# Patient Record
Sex: MALE | Race: OTHER | Employment: FULL TIME | URBAN - NONMETROPOLITAN AREA
[De-identification: names, ages, dates, MRNs, and addresses within clinical notes are randomized per-mention and may not be internally consistent; named-entity substitution may affect disease eponyms.]

---

## 2017-12-28 ENCOUNTER — OFFICE VISIT (OUTPATIENT)
Dept: OPTOMETRY | Age: 62
End: 2017-12-28
Payer: COMMERCIAL

## 2017-12-28 DIAGNOSIS — H52.03 HYPEROPIA OF BOTH EYES WITH ASTIGMATISM AND PRESBYOPIA: Primary | ICD-10-CM

## 2017-12-28 DIAGNOSIS — H54.40 BLIND LEFT EYE: ICD-10-CM

## 2017-12-28 DIAGNOSIS — H52.4 HYPEROPIA OF BOTH EYES WITH ASTIGMATISM AND PRESBYOPIA: Primary | ICD-10-CM

## 2017-12-28 DIAGNOSIS — H52.203 HYPEROPIA OF BOTH EYES WITH ASTIGMATISM AND PRESBYOPIA: Primary | ICD-10-CM

## 2017-12-28 PROCEDURE — 92014 COMPRE OPH EXAM EST PT 1/>: CPT | Performed by: OPTOMETRIST

## 2017-12-28 RX ORDER — BENOXINATE HCL/FLUORESCEIN SOD 0.4%-0.25%
1 DROPS OPHTHALMIC (EYE) ONCE
Status: COMPLETED | OUTPATIENT
Start: 2017-12-28 | End: 2017-12-28

## 2017-12-28 RX ADMIN — Medication 1 DROP: at 13:29

## 2017-12-28 ASSESSMENT — REFRACTION_MANIFEST
OD_AXIS: 082
OD_SPHERE: +2.00
OD_CYLINDER: -0.75
OD_ADD: +2.25

## 2017-12-28 ASSESSMENT — SLIT LAMP EXAM - LIDS
COMMENTS: NORMAL
COMMENTS: NORMAL

## 2017-12-28 ASSESSMENT — REFRACTION_WEARINGRX
OD_SPHERE: +2.00
OD_CYLINDER: -0.75
OD_AXIS: 085
OD_ADD: +2.25
OS_SPHERE: BALANCE
OS_ADD: +2.25
SPECS_TYPE: BIFOCAL

## 2017-12-28 ASSESSMENT — KERATOMETRY
OD_AXISANGLE_DEGREES: 074
OD_AXISANGLE2_DEGREES: 164
OD_K1POWER_DIOPTERS: 36.75
OD_K2POWER_DIOPTERS: 42.75

## 2017-12-28 ASSESSMENT — VISUAL ACUITY
METHOD: SNELLEN - LINEAR
CORRECTION_TYPE: GLASSES

## 2017-12-28 NOTE — PROGRESS NOTES
Conan Nageotte presents today for   Chief Complaint   Patient presents with    Vision Exam   .    HPI     Last Vision Exam: 11/17/2016 AW  Last Ophthalmology Exam: Henrine Louder eye Retinal Detachment 2014  Last Filled Glasses Rx: 11/17/2016  [facility]: [facility]  Update: Glasses  He lost his glasses currently wearing glasses form 4 yrs ago. Main Ophthalmology Exam     External Exam       Right Left    External Normal Normal          Slit Lamp Exam       Right Left    Lids/Lashes Normal Normal    Conjunctiva/Sclera White and quiet White and quiet    Cornea Clear Clear    Anterior Chamber Deep and quiet Deep and quiet    Iris Round and reactive Round and reactive    Lens 1+ Nuclear sclerosis 4+ Nuclear sclerosis    Vitreous Normal Normal          Fundus Exam       Right Left    Disc type3 poor view ; hx of retinal detachment     C/D Ratio 0.2-.25                     Not recorded         Visual Acuity (Snellen - Linear)       Right Left    Dist cc 20/60     Correction:  Glasses        Keratometry     Keratometry       K1 Axis K2 Axis    Right 36.75 164 42.75 074    Left                    Ophthalmology Exam     Wearing Rx       Sphere Cylinder Axis Add    Right +2.00 -0.75 085 +2.25    Left Balance   +2.25    Age:  3yr    Type:  Bifocal                Manifest Refraction     Manifest Refraction       Sphere Cylinder [place] Dist [facility] Add Near [facility]    Right +2.00 -0.75 082 20/25 +2.25 20/25    Left                Manifest Refraction #2 (Auto)       Sphere Cylinder [place] Dist [facility] Add Near [facility]    Right +2.25 -0.50 094       Left                     Final Rx       Sphere Cylinder Axis Dist [facility] Add    Right +2.00 -0.75 082 20/25 +2.25    Left balance     +2.25    Type:  Bifocal    Expiration Date:  12/29/2019            1. Hyperopia of both eyes with astigmatism and presbyopia    2.  Blind left eye           Patient Instructions   New glasses recommended      Return in about 2 years (around 12/28/2019) for complete eye exam.

## 2018-04-20 ENCOUNTER — HOSPITAL ENCOUNTER (OUTPATIENT)
Age: 63
Discharge: HOME | End: 2018-04-20
Payer: COMMERCIAL

## 2018-04-20 ENCOUNTER — HOSPITAL ENCOUNTER (OUTPATIENT)
Dept: GENERAL RADIOLOGY | Age: 63
Discharge: HOME | End: 2018-04-20
Payer: COMMERCIAL

## 2018-04-20 DIAGNOSIS — R06.02 SOB (SHORTNESS OF BREATH): ICD-10-CM

## 2018-04-20 DIAGNOSIS — J40 BRONCHITIS: ICD-10-CM

## 2018-04-20 PROCEDURE — 71046 X-RAY EXAM CHEST 2 VIEWS: CPT

## 2019-01-10 ENCOUNTER — OFFICE VISIT (OUTPATIENT)
Dept: OPTOMETRY | Age: 64
End: 2019-01-10
Payer: COMMERCIAL

## 2019-01-10 DIAGNOSIS — H52.03 HYPEROPIA OF BOTH EYES WITH ASTIGMATISM AND PRESBYOPIA: Primary | ICD-10-CM

## 2019-01-10 DIAGNOSIS — H33.8 OLD RETINAL DETACHMENT, PARTIAL: ICD-10-CM

## 2019-01-10 DIAGNOSIS — H52.203 HYPEROPIA OF BOTH EYES WITH ASTIGMATISM AND PRESBYOPIA: Primary | ICD-10-CM

## 2019-01-10 DIAGNOSIS — H52.4 HYPEROPIA OF BOTH EYES WITH ASTIGMATISM AND PRESBYOPIA: Primary | ICD-10-CM

## 2019-01-10 PROCEDURE — 92014 COMPRE OPH EXAM EST PT 1/>: CPT | Performed by: OPTOMETRIST

## 2019-01-10 ASSESSMENT — VISUAL ACUITY
OS_CC: LP
METHOD: SNELLEN - LINEAR
OD_CC+: -2
CORRECTION_TYPE: GLASSES

## 2019-01-10 ASSESSMENT — REFRACTION_WEARINGRX
OD_AXIS: 082
OS_SPHERE: BALANCE
OD_CYLINDER: -0.75
OS_ADD: +2.25
SPECS_TYPE: BIFOCAL
OD_SPHERE: +2.00
OD_ADD: +2.25

## 2019-01-10 ASSESSMENT — KERATOMETRY
OD_AXISANGLE2_DEGREES: 044
OD_K2POWER_DIOPTERS: 45.00
OD_AXISANGLE_DEGREES: 134
OD_K1POWER_DIOPTERS: 44.50

## 2019-01-10 ASSESSMENT — TONOMETRY
OD_IOP_MMHG: 21
OS_IOP_MMHG: 12
IOP_METHOD: NON-CONTACT AIR PUFF

## 2019-01-10 ASSESSMENT — SLIT LAMP EXAM - LIDS
COMMENTS: NORMAL
COMMENTS: NORMAL

## 2020-08-11 ENCOUNTER — OFFICE VISIT (OUTPATIENT)
Dept: OPTOMETRY | Age: 65
End: 2020-08-11
Payer: COMMERCIAL

## 2020-08-11 PROCEDURE — 92014 COMPRE OPH EXAM EST PT 1/>: CPT | Performed by: OPTOMETRIST

## 2020-08-11 PROCEDURE — 99211 OFF/OP EST MAY X REQ PHY/QHP: CPT

## 2020-08-11 ASSESSMENT — REFRACTION_MANIFEST
OS_ADD: +2.25
OS_SPHERE: BALANCE
OD_SPHERE: +2.50
OD_ADD: +2.25
OD_AXIS: 085
OD_CYLINDER: -0.50

## 2020-08-11 ASSESSMENT — VISUAL ACUITY
OD_CC: 20/25
CORRECTION_TYPE: GLASSES
OS_CC: LP
METHOD: SNELLEN - LINEAR

## 2020-08-11 ASSESSMENT — KERATOMETRY
OS_AXISANGLE_DEGREES: 061
OS_K2POWER_DIOPTERS: 45.50
METHOD_AUTO_MANUAL: AUTOMATED
OS_K1POWER_DIOPTERS: 45.00
OS_AXISANGLE2_DEGREES: 151

## 2020-08-11 ASSESSMENT — TONOMETRY
OS_IOP_MMHG: 11
IOP_METHOD: NON-CONTACT AIR PUFF
OD_IOP_MMHG: 26

## 2020-08-11 ASSESSMENT — REFRACTION_WEARINGRX
OD_SPHERE: +2.25
SPECS_TYPE: BIFOCAL
OD_CYLINDER: -0.50
OD_ADD: +2.25
OD_AXIS: 084
OS_SPHERE: BALANCE

## 2020-08-11 ASSESSMENT — SLIT LAMP EXAM - LIDS
COMMENTS: NORMAL
COMMENTS: NORMAL

## 2020-08-11 NOTE — PROGRESS NOTES
Flaquito Saul presents today for   Chief Complaint   Patient presents with    Blurred Vision    Vision Exam   .    HPI     Blurred Vision     In both eyes. Vision is blurred. Context:  distance vision and reading. Comments     Last Vision Exam: 1/10/2019 AW  Last Ophthalmology Exam: n/a  Last Filled Glasses Rx: 1/10/2019  Insurance: [facility]  Update: Glasses  Distance and reading are getting more blurry  Here with an  because does not speak English  Full time glasses                Current Outpatient Medications   Medication Sig Dispense Refill    PRENATAL VITAMINS PO Take  by mouth. No current facility-administered medications for this visit.           Family History   Problem Relation Age of Onset    Glaucoma Neg Hx     Diabetes Neg Hx     Cataracts Neg Hx      Social History     Socioeconomic History    Marital status: Single     Spouse name: None    Number of children: None    Years of education: None    Highest education level: None   Occupational History    None   Social Needs    Financial resource strain: None    Food insecurity     Worry: None     Inability: None    Transportation needs     Medical: None     Non-medical: None   Tobacco Use    Smoking status: Current Every Day Smoker     Types: Cigarettes    Smokeless tobacco: Never Used   Substance and Sexual Activity    Alcohol use: None    Drug use: None    Sexual activity: None   Lifestyle    Physical activity     Days per week: None     Minutes per session: None    Stress: None   Relationships    Social connections     Talks on phone: None     Gets together: None     Attends Sikhism service: None     Active member of club or organization: None     Attends meetings of clubs or organizations: None     Relationship status: None    Intimate partner violence     Fear of current or ex partner: None     Emotionally abused: None     Physically abused: None     Forced sexual activity: None   Other Topics Concern    None   Social History Narrative    None     History reviewed. No pertinent past medical history. Main Ophthalmology Exam     External Exam       Right Left    External Normal Normal          Slit Lamp Exam       Right Left    Lids/Lashes Normal Normal    Conjunctiva/Sclera White and quiet White and quiet    Cornea Clear Clear    Anterior Chamber Deep and quiet Deep and quiet    Iris Round and reactive Round and reactive    Lens Trace Nuclear sclerosis, 1+ Cortical cataract 3+ Nuclear sclerosis    Vitreous Normal Normal          Fundus Exam       Right Left    Disc type3 total retinal detachment long stainding     C/D Ratio 0.2-. 25     Macula Normal     Vessels Normal     Periphery Normal;wnl                     Tonometry     Tonometry (Non-contact air puff, 1:26 PM)       Right Left    Pressure 26 11   IOP.8             8.2  CH:  7.8          9.4  WS: 8.8          4.5                  Not recorded         Not recorded          Visual Acuity (Snellen - Linear)       Right Left    Dist cc 20/30 LP    Near cc 20/25     Correction:  Glasses          Pupils     Pupils       Pupils    Right PERRL    Left PERRL              Neuro/Psych     Neuro/Psych     Oriented x3:  Yes    Mood/Affect:  Normal              Keratometry     Keratometry (Automated)       K1 Axis K2 Axis    Right        Left 45.00 151 45.50 061                  Ophthalmology Exam     Wearing Rx       Sphere Cylinder Axis Add    Right +2.25 -0.50 084 +2.25    Left BALANCE        Age:  3yr    Type:  Bifocal              Manifest Refraction     Manifest Refraction       Sphere Cylinder Axis Dist [facility] Add    Right +2.50 -0.50 085 20/25+ +2.25    Left BALANCE     +2.25          Manifest Refraction #2 (Auto)       Sphere Cylinder Axis Dist [place] Add    Right +2.50 -0.50 101      Left -18.25 -0.25 160                 Final Rx       Sphere Cylinder Axis Dist [place] Add    Right +2.50 -0.50 085 20/25+ +2.25    Left BALANCE     +2.25    Type:

## 2020-08-11 NOTE — PATIENT INSTRUCTIONS
New glasses recommended;  Recommend exam every year because of retinal detachment in the left eye    If any flashes or floaters in the right eye, return and call to come in and be checked right away

## 2020-08-19 ENCOUNTER — HOSPITAL ENCOUNTER (OUTPATIENT)
Age: 65
Setting detail: SPECIMEN
Discharge: HOME | End: 2020-08-19
Payer: COMMERCIAL

## 2020-08-19 LAB
ABSOLUTE EOS #: 0.17 K/UL (ref 0–0.44)
ABSOLUTE IMMATURE GRANULOCYTE: 0.03 K/UL (ref 0–0.3)
ABSOLUTE LYMPH #: 1.61 K/UL (ref 1.1–3.7)
ABSOLUTE MONO #: 0.57 K/UL (ref 0.1–1.2)
ALBUMIN SERPL-MCNC: 3.9 G/DL (ref 3.5–5.2)
ALBUMIN/GLOBULIN RATIO: 1.5 (ref 1–2.5)
ALP BLD-CCNC: 68 U/L (ref 40–129)
ALT SERPL-CCNC: 11 U/L (ref 5–41)
ANION GAP SERPL CALCULATED.3IONS-SCNC: 10 MMOL/L (ref 9–17)
AST SERPL-CCNC: 19 U/L
BASOPHILS # BLD: 1 % (ref 0–2)
BASOPHILS ABSOLUTE: 0.05 K/UL (ref 0–0.2)
BILIRUB SERPL-MCNC: 0.22 MG/DL (ref 0.3–1.2)
BUN BLDV-MCNC: 15 MG/DL (ref 8–23)
BUN/CREAT BLD: ABNORMAL (ref 9–20)
CALCIUM SERPL-MCNC: 9 MG/DL (ref 8.6–10.4)
CHLORIDE BLD-SCNC: 104 MMOL/L (ref 98–107)
CHOLESTEROL/HDL RATIO: 3.5
CHOLESTEROL: 166 MG/DL
CO2: 26 MMOL/L (ref 20–31)
CREAT SERPL-MCNC: 0.84 MG/DL (ref 0.7–1.2)
DIFFERENTIAL TYPE: ABNORMAL
EOSINOPHILS RELATIVE PERCENT: 2 % (ref 1–4)
GFR AFRICAN AMERICAN: >60 ML/MIN
GFR NON-AFRICAN AMERICAN: >60 ML/MIN
GFR SERPL CREATININE-BSD FRML MDRD: ABNORMAL ML/MIN/{1.73_M2}
GFR SERPL CREATININE-BSD FRML MDRD: ABNORMAL ML/MIN/{1.73_M2}
GLUCOSE BLD-MCNC: 95 MG/DL (ref 70–99)
HAV IGM SER IA-ACNC: NONREACTIVE
HCT VFR BLD CALC: 44.3 % (ref 40.7–50.3)
HDLC SERPL-MCNC: 47 MG/DL
HEMOGLOBIN: 14.3 G/DL (ref 13–17)
HEPATITIS B CORE IGM ANTIBODY: NONREACTIVE
HEPATITIS B SURFACE ANTIGEN: NONREACTIVE
HEPATITIS C ANTIBODY: NONREACTIVE
HIV AG/AB: NONREACTIVE
IMMATURE GRANULOCYTES: 0 %
LDL CHOLESTEROL: 86 MG/DL (ref 0–130)
LYMPHOCYTES # BLD: 18 % (ref 24–43)
MCH RBC QN AUTO: 30.3 PG (ref 25.2–33.5)
MCHC RBC AUTO-ENTMCNC: 32.3 G/DL (ref 28.4–34.8)
MCV RBC AUTO: 93.9 FL (ref 82.6–102.9)
MONOCYTES # BLD: 7 % (ref 3–12)
NRBC AUTOMATED: 0 PER 100 WBC
PDW BLD-RTO: 12.7 % (ref 11.8–14.4)
PLATELET # BLD: 244 K/UL (ref 138–453)
PLATELET ESTIMATE: ABNORMAL
PMV BLD AUTO: 10.5 FL (ref 8.1–13.5)
POTASSIUM SERPL-SCNC: 4.3 MMOL/L (ref 3.7–5.3)
RBC # BLD: 4.72 M/UL (ref 4.21–5.77)
RBC # BLD: ABNORMAL 10*6/UL
SEG NEUTROPHILS: 72 % (ref 36–65)
SEGMENTED NEUTROPHILS ABSOLUTE COUNT: 6.35 K/UL (ref 1.5–8.1)
SODIUM BLD-SCNC: 140 MMOL/L (ref 135–144)
T. PALLIDUM, IGG: NONREACTIVE
THYROXINE, FREE: 1.13 NG/DL (ref 0.93–1.7)
TOTAL PROTEIN: 6.5 G/DL (ref 6.4–8.3)
TRIGL SERPL-MCNC: 164 MG/DL
TSH SERPL DL<=0.05 MIU/L-ACNC: 0.62 MIU/L (ref 0.3–5)
VLDLC SERPL CALC-MCNC: ABNORMAL MG/DL (ref 1–30)
WBC # BLD: 8.8 K/UL (ref 3.5–11.3)
WBC # BLD: ABNORMAL 10*3/UL

## 2020-08-22 LAB
C. TRACHOMATIS DNA ,URINE: NEGATIVE
N. GONORRHOEAE DNA, URINE: NEGATIVE
SPECIMEN DESCRIPTION: NORMAL

## 2022-03-24 ENCOUNTER — OFFICE VISIT (OUTPATIENT)
Dept: OPTOMETRY | Age: 67
End: 2022-03-24
Payer: COMMERCIAL

## 2022-03-24 DIAGNOSIS — H33.002 RETINAL DETACHMENT WITH RETINAL DEFECT OF LEFT EYE: ICD-10-CM

## 2022-03-24 DIAGNOSIS — H52.02 HYPEROPIA OF LEFT EYE WITH ASTIGMATISM AND PRESBYOPIA: Primary | ICD-10-CM

## 2022-03-24 DIAGNOSIS — H54.40 BLINDNESS OF LEFT EYE WITH NORMAL VISION IN CONTRALATERAL EYE: ICD-10-CM

## 2022-03-24 DIAGNOSIS — H52.4 HYPEROPIA OF LEFT EYE WITH ASTIGMATISM AND PRESBYOPIA: Primary | ICD-10-CM

## 2022-03-24 DIAGNOSIS — H52.202 HYPEROPIA OF LEFT EYE WITH ASTIGMATISM AND PRESBYOPIA: Primary | ICD-10-CM

## 2022-03-24 PROCEDURE — 99211 OFF/OP EST MAY X REQ PHY/QHP: CPT

## 2022-03-24 PROCEDURE — 92014 COMPRE OPH EXAM EST PT 1/>: CPT | Performed by: OPTOMETRIST

## 2022-03-24 ASSESSMENT — REFRACTION_WEARINGRX
SPECS_TYPE: BIFOCAL
OD_ADD: +2.25
OD_AXIS: 085
OD_CYLINDER: -0.50
OD_SPHERE: +2.50
OS_ADD: +2.25
OS_SPHERE: BALANCE

## 2022-03-24 ASSESSMENT — VISUAL ACUITY
METHOD: SNELLEN - LINEAR
OS_SC: LP
OD_CC: 20/20 OU
CORRECTION_TYPE: GLASSES
OD_CC+: +2

## 2022-03-24 ASSESSMENT — TONOMETRY
OD_IOP_MMHG: 37
IOP_METHOD: NON-CONTACT AIR PUFF
OS_IOP_MMHG: 12

## 2022-03-24 ASSESSMENT — ENCOUNTER SYMPTOMS
RESPIRATORY NEGATIVE: 0
GASTROINTESTINAL NEGATIVE: 0
ALLERGIC/IMMUNOLOGIC NEGATIVE: 0
EYES NEGATIVE: 0

## 2022-03-24 ASSESSMENT — SLIT LAMP EXAM - LIDS
COMMENTS: NORMAL
COMMENTS: NORMAL

## 2022-03-24 ASSESSMENT — KERATOMETRY
OD_AXISANGLE2_DEGREES: 038
OD_AXISANGLE_DEGREES: 128
OS_K1POWER_DIOPTERS: 45.25
OS_K2POWER_DIOPTERS: 45.25
OS_AXISANGLE2_DEGREES: 000
OD_K1POWER_DIOPTERS: 44.75
OS_AXISANGLE_DEGREES: 090
METHOD_AUTO_MANUAL: AUTOMATED
OD_K2POWER_DIOPTERS: 45.00

## 2022-03-24 ASSESSMENT — REFRACTION_MANIFEST
OD_ADD: +2.50
OD_CYLINDER: -0.75
OD_SPHERE: +2.25
OS_SPHERE: BALANCE
OD_AXIS: 087

## 2022-03-24 NOTE — PROGRESS NOTES
Cassius Sommers presents today for   Chief Complaint   Patient presents with    Blurred Vision    Vision Exam   .    HPI     Blurred Vision     Laterality: right eye    Quality: blurred    Context: distance vision, near vision and reading              Comments     Last Vision Exam: 8/11/2020 Aw  Last Ophthalmology Exam: n/a  Last Filled Glasses Rx: 1/11/2021  Insurance: [employer]  Update: Glasses  Distance and reading are getting more blurry in the right eye  Does not speak very good english  Full time glasses  Doing pretty well                Current Outpatient Medications   Medication Sig Dispense Refill    PRENATAL VITAMINS PO Take  by mouth. No current facility-administered medications for this visit. Family History   Problem Relation Age of Onset    Glaucoma Neg Hx     Diabetes Neg Hx     Cataracts Neg Hx      Social History     Socioeconomic History    Marital status: Single     Spouse name: None    Number of children: None    Years of education: None    Highest education level: None   Occupational History    None   Tobacco Use    Smoking status: Current Every Day Smoker     Types: Cigarettes    Smokeless tobacco: Never Used   Substance and Sexual Activity    Alcohol use: None    Drug use: None    Sexual activity: None   Other Topics Concern    None   Social History Narrative    None     Social Determinants of Health     Financial Resource Strain:     Difficulty of Paying Living Expenses: Not on file   Food Insecurity:     Worried About Running Out of Food in the Last Year: Not on file    [place] of Food in the Last Year: Not on file   Transportation Needs:     Lack of Transportation (Medical): Not on file    Lack of Transportation (Non-Medical):  Not on file   Physical Activity:     Days of Exercise per Week: Not on file    Minutes of Exercise per Session: Not on file   Stress:     Feeling of Stress : Not on file   Social Connections:     Frequency of Communication with Friends and Family: Not on file    Frequency of Social Gatherings with Friends and Family: Not on file    Attends Taoist Services: Not on file    Active Member of Clubs or Organizations: Not on file    Attends Club or Organization Meetings: Not on file    Marital Status: Not on file   Intimate Partner Violence:     Fear of Current or Ex-Partner: Not on file    Emotionally Abused: Not on file    Physically Abused: Not on file    Sexually Abused: Not on file   Housing Stability:     Unable to Pay for Housing in the Last Year: Not on file    Number of [place] in the Last Year: Not on file    Unstable Housing in the Last Year: Not on file     History reviewed. No pertinent past medical history. ROS     Negative for: Constitutional, Gastrointestinal, Neurological, Skin, Genitourinary, Musculoskeletal, HENT, Endocrine, Cardiovascular, Eyes, Respiratory, Psychiatric, Allergic/Imm, Heme/Lymph          Main Ophthalmology Exam     External Exam       Right Left    External Normal Normal          Slit Lamp Exam       Right Left    Lids/Lashes Normal Normal    Conjunctiva/Sclera White and quiet White and quiet    Cornea Clear Clear    Anterior Chamber Deep and quiet Deep and quiet    Iris Round and reactive Round and reactive    Lens Trace Nuclear sclerosis, 1+ Cortical cataract 4+ Nuclear sclerosis    Vitreous Normal Normal          Fundus Exam       Right Left    Disc type3 total retinal detachment long stainding     C/D Ratio 0.2-. 25     Macula Normal     Vessels Normal     Periphery Normal;wnl               <div id=\"MAIN_EXAM_REVIEWED\"></div>      Tonometry     Tonometry (Non-contact air puff, 2:10 PM)       Right Left    Pressure 37 12   IOP.9             18.7  CH:  4.4          16.3  WS: 9.1          3.7                 Not recorded       Not recorded         Visual Acuity (Snellen - Linear)       Right Left    Dist sc  LP    Dist cc 20/30 +2     Near cc 20/20 OU     Correction: Glasses Pupils     Pupils       Pupils    Right PERRL    Left PERRL              Neuro/Psych     Neuro/Psych     Oriented x3: Yes    Mood/Affect: Normal              Keratometry     Keratometry (Automated)       K1 Axis K2 Axis    Right 44.75 038 45.00 128    Left 45.25 000 45.25 090                  Ophthalmology Exam     Wearing Rx       Sphere Cylinder Axis Add    Right +2.50 -0.50 085 +2.25    Left BALANCE    +2.25    Age: 4yrs    Type: Bifocal              Manifest Refraction     Manifest Refraction       Sphere Cylinder Axis Dist [place] Add    Right +2.25 -0.75 087 20/25 +2.50    Left BALANCE               Manifest Refraction #2 (Auto)       Sphere Cylinder Axis Dist [place] Add    Right +2.50 -0.75 088      Left -18.25 -0.25 157                 Final Rx       Sphere Cylinder Axis Dist [place] Add    Right +2.25 -0.75 087 20/25 +2.50    Left BALANCE         Type: Bifocal    Expiration Date: 3/24/2024            No orders of the defined types were placed in this encounter. IMPRESSION:  1. Hyperopia of left eye with astigmatism and presbyopia    2. Retinal detachment with retinal defect of left eye    3. Blindness of left eye with normal vision in contralateral eye        PLAN:    1. New glasses reocmmended  2. If any signs of RD in the right eye ; flashes or curtain over the vision, patient is instructed to return asap; from an accident       There are no Patient Instructions on file for this visit.    Return in about 2 years (around 3/24/2024) for complete eye exam.